# Patient Record
Sex: FEMALE | Race: WHITE | HISPANIC OR LATINO | Employment: UNEMPLOYED | URBAN - METROPOLITAN AREA
[De-identification: names, ages, dates, MRNs, and addresses within clinical notes are randomized per-mention and may not be internally consistent; named-entity substitution may affect disease eponyms.]

---

## 2017-11-18 ENCOUNTER — HOSPITAL ENCOUNTER (EMERGENCY)
Facility: MEDICAL CENTER | Age: 42
End: 2017-11-18
Attending: EMERGENCY MEDICINE

## 2017-11-18 VITALS
HEIGHT: 63 IN | RESPIRATION RATE: 16 BRPM | BODY MASS INDEX: 30.12 KG/M2 | SYSTOLIC BLOOD PRESSURE: 137 MMHG | OXYGEN SATURATION: 99 % | DIASTOLIC BLOOD PRESSURE: 68 MMHG | WEIGHT: 170 LBS | HEART RATE: 83 BPM | TEMPERATURE: 97.8 F

## 2017-11-18 DIAGNOSIS — R51.9 NONINTRACTABLE HEADACHE, UNSPECIFIED CHRONICITY PATTERN, UNSPECIFIED HEADACHE TYPE: ICD-10-CM

## 2017-11-18 PROCEDURE — 99283 EMERGENCY DEPT VISIT LOW MDM: CPT

## 2017-11-18 RX ORDER — HYDROCODONE BITARTRATE AND ACETAMINOPHEN 5; 325 MG/1; MG/1
1-2 TABLET ORAL EVERY 6 HOURS PRN
Qty: 20 TAB | Refills: 0 | Status: SHIPPED | OUTPATIENT
Start: 2017-11-18 | End: 2020-08-30

## 2017-11-18 RX ORDER — AMOXICILLIN 875 MG/1
875 TABLET, COATED ORAL 2 TIMES DAILY
Qty: 42 TAB | Refills: 0 | Status: SHIPPED | OUTPATIENT
Start: 2017-11-18 | End: 2017-12-09

## 2017-11-18 ASSESSMENT — PAIN SCALES - GENERAL: PAINLEVEL_OUTOF10: 8

## 2017-11-18 ASSESSMENT — ENCOUNTER SYMPTOMS
CHILLS: 0
COUGH: 0
FEVER: 0
HEADACHES: 1

## 2017-11-18 ASSESSMENT — LIFESTYLE VARIABLES: DO YOU DRINK ALCOHOL: NO

## 2017-11-19 NOTE — ED NOTES
Discharge instructions given and understood by pt, all prescriptions given to pt, all questions answered, all belongings sent with pt, pt ambulates out of dept in stable condition.

## 2017-11-19 NOTE — ED PROVIDER NOTES
ED Provider Note    Scribed for Chano Crespo M.D. by Radha Silverio. 11/18/2017, 5:48 PM.    Primary care provider: Pcp Pt States None  Means of arrival: Walk-in  History obtained from: Patient   History limited by: None    CHIEF COMPLAINT  Chief Complaint   Patient presents with   • Headache     worsening for the past 2 wks       HPI  Felipa Baeza is a 42 y.o. female who presents to the Emergency Department for evaluation of a headache located in her cheeks and radiates around her entire head that has worsened over the last two weeks. She has been taking Tylenol with minimal relief. She also has a dry cough associated. She denies any chills, dental pain, or nasal discharge. Patient was seen at [facility] [address] two days ago and was told that it was sinus pressure and that she had high blood pressure. Patient was given nasal spray for congestion. She measured her blood pressure at home which was 160 systolic. Patient is not currently on a prescription pain medication. The patient denies any past pertinent medical history, use of daily medications or allergies to medications.      REVIEW OF SYSTEMS  Review of Systems   Constitutional: Negative for chills and fever.   Respiratory: Negative for cough.    Neurological: Positive for headaches.   All other systems reviewed and are negative.  C.    PAST MEDICAL HISTORY    denies any past pertinent medical history    SURGICAL HISTORY   has a past surgical history that includes other abdominal surgery.    SOCIAL HISTORY  Social History   Substance Use Topics   • Smoking status: Never Smoker   • Alcohol use No      History   Drug Use No       FAMILY HISTORY  No family history noted    CURRENT MEDICATIONS  No current facility-administered medications on file prior to encounter.      Current Outpatient Prescriptions on File Prior to Encounter   Medication Sig Dispense Refill   • hydrocodone-acetaminophen (NORCO) 5-325 MG TABS per tablet Take 1-2 Tabs by mouth every four  "hours as needed (mild pain). 10 Tab 0   • phenazopyridine (PYRIDIUM) 200 MG TABS Take 1 Tab by mouth 3 times a day as needed (bladder pain). 6 Tab 0       ALLERGIES  No Known Allergies    PHYSICAL EXAM  VITAL SIGNS: /64   Pulse 83   Temp 36.6 °C (97.8 °F) (Temporal)   Resp 16   Ht 1.6 m (5' 3\")   Wt 77.1 kg (170 lb)   SpO2 99%   BMI 30.11 kg/m²     Constitutional:  Moderate distress.   HENT:  Moist mucous membranes  Eyes:  No conjunctivitis or icterus  Cardiovascular:  Regular rate and rhythm, no murmurs  Thorax & Lungs:  Normal breath sounds, no rhonchi  Skin:.  no rash  Extremities:   no edema  Vascular:  symmetric radial pulse  Neurologic: Alert and oriented. Cranial nerves II-XII intact, EOMs intact, no tongue deviation, PERRL, no facial asymmetry to motor or sensation, symmetric palate, normal finger-to-nose test, no pronator drift. No focal motor deficits. Symmetric reflexes. Normal station and gait, normal tandem walk.      COURSE & MEDICAL DECISION MAKING  Pertinent Labs & Imaging studies reviewed. (See chart for details)    5:48 PM - Patient seen and examined at bedside. Advised patient to write her blood pressure from home monitor three times a day and follow-up with primary care doctor. Advised patient to use the nasal spray she was prescribed with for one week. Discussed that I will prescribe her with 875mg Amoxicillin since her head pain has been present for two weeks and 5-325mg Norco.    Medical Decision Making:  Patient's had a two-week headache it's facial and temporal pain not acute in onset no fever or chills. There is pain with bending. I believe she has sinus congestion pain she has possible infection due to the two-week course of will be placed on a prolonged course of antibiotics for treatment as well as pain medication. She does have steroidat home she will start using that for 1 week. She is given return precautions and follow-up    I reviewed prescription monitoring program for " patient's narcotic use before prescribing a scheduled drug.The patient will not drink alcohol nor drive with prescribed medications. The patient will return for new or worsening symptoms and is stable at the time of discharge.      DISPOSITION:  Patient will be discharged home in stable condition.    FOLLOW UP:  [facility]  [address]  [place] [address]  [phone]            OUTPATIENT MEDICATIONS:  Discharge Medication List as of 11/18/2017  6:03 PM      START taking these medications    Details   amoxicillin (AMOXIL) 875 MG tablet Take 1 Tab by mouth 2 times a day for 21 days., Disp-42 Tab, R-0, Print Rx Paper      !! hydrocodone-acetaminophen (NORCO) 5-325 MG Tab per tablet Take 1-2 Tabs by mouth every 6 hours as needed., Disp-20 Tab, R-0, Print Rx Paper       !! - Potential duplicate medications found. Please discuss with provider.            FINAL IMPRESSION  1. Nonintractable headache, unspecified chronicity pattern, unspecified headache type          I, Radha Silverio (Scribe), am scribing for, and in the presence of, Chano Crespo M.D..    Electronically signed by: Radha Silverio (Scribe), 11/18/2017    I, Chano Crespo M.D. personally performed the services described in this documentation, as scribed by Radha Silverio in my presence, and it is both accurate and complete.    The note accurately reflects work and decisions made by me.  Chano Crespo  11/18/2017  7:15 PM

## 2017-11-19 NOTE — ED NOTES
Pt to triage in wheelchair  Chief Complaint   Patient presents with   • Headache     worsening for the past 2 wks   seen at clinic yesterday placed on medication for allergies, reports no improvement, told her BP was high at that time as well and checked it at home and   Pt asked to wait in lobby, pt updated on triage process and pt asked to inform RN of any changes.

## 2018-08-20 ENCOUNTER — HOSPITAL ENCOUNTER (EMERGENCY)
Facility: MEDICAL CENTER | Age: 43
End: 2018-08-20
Attending: EMERGENCY MEDICINE

## 2018-08-20 ENCOUNTER — APPOINTMENT (OUTPATIENT)
Dept: RADIOLOGY | Facility: MEDICAL CENTER | Age: 43
End: 2018-08-20
Attending: EMERGENCY MEDICINE

## 2018-08-20 VITALS
HEIGHT: 61 IN | OXYGEN SATURATION: 93 % | TEMPERATURE: 98.4 F | WEIGHT: 232.81 LBS | RESPIRATION RATE: 15 BRPM | BODY MASS INDEX: 43.95 KG/M2 | HEART RATE: 81 BPM | DIASTOLIC BLOOD PRESSURE: 85 MMHG | SYSTOLIC BLOOD PRESSURE: 152 MMHG

## 2018-08-20 DIAGNOSIS — I10 HYPERTENSION, UNSPECIFIED TYPE: ICD-10-CM

## 2018-08-20 LAB
ALBUMIN SERPL BCP-MCNC: 4.3 G/DL (ref 3.2–4.9)
ALBUMIN/GLOB SERPL: 1.3 G/DL
ALP SERPL-CCNC: 61 U/L (ref 30–99)
ALT SERPL-CCNC: 25 U/L (ref 2–50)
ANION GAP SERPL CALC-SCNC: 6 MMOL/L (ref 0–11.9)
AST SERPL-CCNC: 23 U/L (ref 12–45)
BASOPHILS # BLD AUTO: 0.5 % (ref 0–1.8)
BASOPHILS # BLD: 0.05 K/UL (ref 0–0.12)
BILIRUB SERPL-MCNC: 0.7 MG/DL (ref 0.1–1.5)
BUN SERPL-MCNC: 13 MG/DL (ref 8–22)
CALCIUM SERPL-MCNC: 9.4 MG/DL (ref 8.4–10.2)
CHLORIDE SERPL-SCNC: 108 MMOL/L (ref 96–112)
CO2 SERPL-SCNC: 23 MMOL/L (ref 20–33)
CREAT SERPL-MCNC: 0.47 MG/DL (ref 0.5–1.4)
EKG IMPRESSION: NORMAL
EOSINOPHIL # BLD AUTO: 0.22 K/UL (ref 0–0.51)
EOSINOPHIL NFR BLD: 2.1 % (ref 0–6.9)
ERYTHROCYTE [DISTWIDTH] IN BLOOD BY AUTOMATED COUNT: 44.3 FL (ref 35.9–50)
GLOBULIN SER CALC-MCNC: 3.4 G/DL (ref 1.9–3.5)
GLUCOSE SERPL-MCNC: 110 MG/DL (ref 65–99)
HCT VFR BLD AUTO: 42.3 % (ref 37–47)
HGB BLD-MCNC: 14 G/DL (ref 12–16)
IMM GRANULOCYTES # BLD AUTO: 0.04 K/UL (ref 0–0.11)
IMM GRANULOCYTES NFR BLD AUTO: 0.4 % (ref 0–0.9)
LYMPHOCYTES # BLD AUTO: 1.64 K/UL (ref 1–4.8)
LYMPHOCYTES NFR BLD: 16 % (ref 22–41)
MCH RBC QN AUTO: 29.4 PG (ref 27–33)
MCHC RBC AUTO-ENTMCNC: 33.1 G/DL (ref 33.6–35)
MCV RBC AUTO: 88.7 FL (ref 81.4–97.8)
MONOCYTES # BLD AUTO: 0.55 K/UL (ref 0–0.85)
MONOCYTES NFR BLD AUTO: 5.4 % (ref 0–13.4)
NEUTROPHILS # BLD AUTO: 7.77 K/UL (ref 2–7.15)
NEUTROPHILS NFR BLD: 75.6 % (ref 44–72)
NRBC # BLD AUTO: 0 K/UL
NRBC BLD-RTO: 0 /100 WBC
PLATELET # BLD AUTO: 440 K/UL (ref 164–446)
PMV BLD AUTO: 9.5 FL (ref 9–12.9)
POTASSIUM SERPL-SCNC: 3.6 MMOL/L (ref 3.6–5.5)
PROT SERPL-MCNC: 7.7 G/DL (ref 6–8.2)
RBC # BLD AUTO: 4.77 M/UL (ref 4.2–5.4)
SODIUM SERPL-SCNC: 137 MMOL/L (ref 135–145)
TROPONIN I SERPL-MCNC: <0.02 NG/ML (ref 0–0.04)
WBC # BLD AUTO: 10.3 K/UL (ref 4.8–10.8)

## 2018-08-20 PROCEDURE — 85025 COMPLETE CBC W/AUTO DIFF WBC: CPT

## 2018-08-20 PROCEDURE — 93005 ELECTROCARDIOGRAM TRACING: CPT

## 2018-08-20 PROCEDURE — 36415 COLL VENOUS BLD VENIPUNCTURE: CPT

## 2018-08-20 PROCEDURE — 96375 TX/PRO/DX INJ NEW DRUG ADDON: CPT

## 2018-08-20 PROCEDURE — 84484 ASSAY OF TROPONIN QUANT: CPT

## 2018-08-20 PROCEDURE — 93005 ELECTROCARDIOGRAM TRACING: CPT | Performed by: EMERGENCY MEDICINE

## 2018-08-20 PROCEDURE — 700111 HCHG RX REV CODE 636 W/ 250 OVERRIDE (IP): Performed by: EMERGENCY MEDICINE

## 2018-08-20 PROCEDURE — 71045 X-RAY EXAM CHEST 1 VIEW: CPT

## 2018-08-20 PROCEDURE — 99284 EMERGENCY DEPT VISIT MOD MDM: CPT

## 2018-08-20 PROCEDURE — 96374 THER/PROPH/DIAG INJ IV PUSH: CPT

## 2018-08-20 PROCEDURE — 80053 COMPREHEN METABOLIC PANEL: CPT

## 2018-08-20 RX ORDER — ONDANSETRON 2 MG/ML
4 INJECTION INTRAMUSCULAR; INTRAVENOUS ONCE
Status: COMPLETED | OUTPATIENT
Start: 2018-08-20 | End: 2018-08-20

## 2018-08-20 RX ORDER — LISINOPRIL 10 MG/1
10 TABLET ORAL DAILY
Qty: 30 TAB | Refills: 0 | Status: SHIPPED | OUTPATIENT
Start: 2018-08-20 | End: 2020-08-30

## 2018-08-20 RX ORDER — IBUPROFEN 200 MG
800 TABLET ORAL EVERY 6 HOURS PRN
COMMUNITY

## 2018-08-20 RX ORDER — MORPHINE SULFATE 4 MG/ML
4 INJECTION, SOLUTION INTRAMUSCULAR; INTRAVENOUS ONCE
Status: COMPLETED | OUTPATIENT
Start: 2018-08-20 | End: 2018-08-20

## 2018-08-20 RX ADMIN — ONDANSETRON 4 MG: 2 INJECTION INTRAMUSCULAR; INTRAVENOUS at 13:37

## 2018-08-20 RX ADMIN — MORPHINE SULFATE 4 MG: 4 INJECTION INTRAVENOUS at 13:37

## 2018-08-20 NOTE — ED NOTES
Discharge information provided. Pt and family  verbalized understanding of discharge instructions to follow up with PCP and to return to ER if condition worsens. Pt expressed the awareness of not driving or operating heavy machinery, has ride home with Son. Pt ambulated out of ER in a steady gait, no additional questions or concerns. Educated on new medication, paper script given,.

## 2018-08-20 NOTE — ED NOTES
"Pt c/o chest tightness during triage, pt taken immediately to room, EKG completed and presented to ERP.  Chief Complaint   Patient presents with   • Hypertension     BP was 172/89 this AM   • Headache     since last night, was woken up at 3 AM with chest tightness and headache   • Chest Pressure     since last night, got better this AM but is now worsening.     /85   Pulse 99   Temp 36.9 °C (98.5 °F)   Resp 20   Ht 1.549 m (5' 1\")   Wt 105.6 kg (232 lb 12.9 oz)   SpO2 98%   BMI 43.99 kg/m²     "

## 2018-08-20 NOTE — ED NOTES
Med rec updated and complete  Allergies reviewed  Interviewed pt with family at bedside with permission from pt.  Pt reports no prescription medications or vitamins.  Pt reports no antibiotics in the last 30 days.

## 2018-08-20 NOTE — DISCHARGE INSTRUCTIONS
Hipertensión  (Hypertension)  La hipertensión, conocida comúnmente nuzhat presión arterial uri, se produce cuando la jannie bombea en las arterias con mucha fuerza. Las arterias son los vasos sanguíneos que transportan la jannie desde el corazón hacia todas las partes del cuerpo. Patsy lectura de la presión arterial consiste en un número más alto sobre un número más bajo, por ejemplo, 110/72. El número más alto (presión sistólica) corresponde a la presión interna de las arterias cuando el corazón bombea jannie. El número más bajo (presión diastólica) corresponde a la presión interna de las arterias cuando el corazón se relaja. En condiciones ideales, la presión arterial debe ser inferior a 120/80.  La hipertensión fuerza al corazón a trabajar más para bombear la jannie. Las arterias pueden estrecharse o ponerse rígidas. La hipertensión no tratada o no controlada puede causar infarto de miocardio, ictus, enfermedad renal y otros problemas.  FACTORES DE RIESGO  Algunos factores de riesgo de hipertensión son controlables, joni otros no lo son.  Entre los factores de riesgo que usted no puede controlar, se incluyen los siguientes:  · La larry. El riesgo es mayor para las personas afroamericanas.  · La edad. Los riesgos aumentan con la edad.  · El sexo. Antes de los 45 años, los hombres corren más riesgo que las mujeres. Después de los 65 años, las mujeres corren más riesgo que los hombres.  Entre los factores de riesgo que usted puede controlar, se incluyen los siguientes:  · No hacer la cantidad suficiente de actividad física o ejercicio.  · Tener sobrepeso.  · Consumir mucha grasa, azúcar, calorías o sal en la dieta.  · Beber alcohol en exceso.  SIGNOS Y SÍNTOMAS  Por lo general, la hipertensión no causa signos o síntomas. La hipertensión arterial demasiado uri (crisis hipertensiva) puede causar dolor de blake, ansiedad, falta de aire y hemorragia nasal.  DIAGNÓSTICO  Para detectar si usted tiene hipertensión, el médico  le medirá la presión arterial mientras esté sentado, con el brazo levantado a la altura del corazón. Debe medirla al menos dos veces en el mismo brazo. Determinadas condiciones pueden causar cindy diferencia de presión arterial entre el brazo kala y el derecho. El hecho de tener cindy helene lectura de la presión arterial más uri que lo normal no significa que necesita un tratamiento. Si no está emilio si tiene hipertensión arterial, es posible que se le pida que regrese otro día para volver a controlarle la presión arterial. O alia se le puede pedir que se controle la presión arterial en bernal casa krysta 1 o más meses.  TRATAMIENTO  El tratamiento de la hipertensión arterial incluye hacer cambios en el estilo de orlando y, posiblemente, deirdre medicamentos. Un estilo de orlando saludable puede ayudar a bajar la presión arterial uri. Quizá deba cambiar algunos hábitos.  Los cambios en el estilo de orlando pueden incluir lo siguiente:  · Seguir la dieta DASH. Esta dieta tiene un alto contenido de frutas, verduras y cereales integrales. Incluye poca cantidad de sal, lynette barbosa y azúcares agregados.  · Mantenga el consumo de sodio por debajo de 2 300 mg por día.  · Realizar al menos entre 30 y 45 minutos de ejercicio aeróbico, 4 veces por semana nuzhat mínimo.  · Perder peso, si es necesario.  · No fumar.  · Limitar el consumo de bebidas alcohólicas.  · Aprender formas de reducir el estrés.  El médico puede recetarle medicamentos si los cambios en el estilo de orlando no son suficientes para lograr controlar la presión arterial y si cindy de las siguientes afirmaciones es verdadera:  · Tiene entre 18 y 59 años y bernal presión arterial sistólica está por encima de 140.  · Tiene 60 años o más y bernal presión arterial sistólica está por encima de 150.  · Bernal presión arterial diastólica está por encima de 90.  · Tiene diabetes y bernal presión arterial sistólica está por encima de 140 o bernal presión arterial diastólica está por encima de 90.  · Tiene  cindy enfermedad renal y contreras presión arterial está por encima de 140/90.  · Tiene cindy enfermedad cardíaca y contreras presión arterial está por encima de 140/90.  La presión arterial deseada puede variar en función de las enfermedades, la edad y otros factores personales.  INSTRUCCIONES PARA EL CUIDADO EN EL HOGAR  · Harsh que le midan de nuevo la presión arterial según las indicaciones del médico.  · Phelan los medicamentos solamente nuzhat se lo haya indicado el médico. Siga cuidadosamente las indicaciones. Los medicamentos para la presión arterial deben tomarse según las indicaciones. Los medicamentos pierden eficacia al omitir las dosis. El hecho de omitir las dosis también aumenta el riesgo de otros problemas.  · No fume.  · Contrólese la presión arterial en contreras casa según las indicaciones del médico.  SOLICITE ATENCIÓN MÉDICA SI:  · Piensa que tiene cindy reacción alérgica a los medicamentos.  · Tiene mareos o cristino de blake con recurrencia.  · Tiene hinchazón en los tobillos.  · Tiene problemas de visión.  SOLICITE ATENCIÓN MÉDICA DE INMEDIATO SI:  · Siente un dolor de blake intenso o confusión.  · Siente debilidad inusual, adormecimiento o que se desmayará.  · Siente dolor intenso en el pecho o en el abdomen.  · Vomita repetidas veces.  · Tiene dificultad para respirar.  ASEGÚRESE DE QUE:  · Comprende estas instrucciones.  · Controlará contreras afección.  · Recibirá ayuda de inmediato si no mejora o si empeora.  Esta información no tiene nuzhat fin reemplazar el consejo del médico. Asegúrese de hacerle al médico cualquier pregunta que tenga.  Document Released: 12/18/2006 Document Revised: 05/03/2016 Document Reviewed: 10/10/2014  [employer] Interactive Patient Education © 2017 [employer]

## 2018-08-20 NOTE — ED PROVIDER NOTES
ED Provider Note    CHIEF COMPLAINT  Chief Complaint   Patient presents with   • Hypertension     BP was 172/89 this AM   • Headache     since last night, was woken up at 3 AM with chest tightness and headache   • Chest Pressure     since last night, got better this AM but is now worsening.       HPI  Felipa Eugene is a 43 y.o. female who presents for evaluation of hypertension.  Patient is primarily Latvian-speaking and history is obtained through an adult family member interpreting.  Patient states that 3 AM she had chest tightness and a headache.  She states that this is what happens when her blood pressure goes up.  She went and had her blood pressure checked today and it was 172/89 so she is come to the emergency department.  She is not on any blood pressure medication.  She states that her chest discomfort is essentially gone at this point.  She had no associated symptoms such as shortness of breath or diaphoresis or radiation of the discomfort.  Cardiac risk factors are negative for adaptive tobacco, negative for diabetes, negative for cholesterol, negative for a current diagnosis of hypertension and a negative family history.  She states was bothering her most at this time is her headache.  She has had no head trauma.  She has had no fevers.  She has had no cough or sputum production.    REVIEW OF SYSTEMS  See HPI for further details. All other systems negative.    PAST MEDICAL HISTORY  History reviewed. No pertinent past medical history.    FAMILY HISTORY  History reviewed. No pertinent family history.    SOCIAL HISTORY  Social History     Social History   • Marital status: N/A     Spouse name: N/A   • Number of children: N/A   • Years of education: N/A     Social History Main Topics   • Smoking status: Never Smoker   • Smokeless tobacco: Never Used   • Alcohol use No   • Drug use: No   • Sexual activity: Not on file     Other Topics Concern   • Not on file     Social History Narrative   • No  "narrative on file       SURGICAL HISTORY  Past Surgical History:   Procedure Laterality Date   • VAGINAL HYSTERECTOMY TOTAL         CURRENT MEDICATIONS  Home Medications     Reviewed by Collette Golden R.N. (Registered Nurse) on 08/20/18 at 1301  Med List Status: Not Addressed   Medication Last Dose Status   ibuprofen (MOTRIN) 200 MG Tab 8/19/2018 Active                ALLERGIES  No Known Allergies    PHYSICAL EXAM  VITAL SIGNS: /85   Pulse 91   Temp 36.9 °C (98.5 °F)   Resp 20   Ht 1.549 m (5' 1\")   Wt 105.6 kg (232 lb 12.9 oz)   SpO2 98%   BMI 43.99 kg/m²   Constitutional: Well developed, Well nourished, No acute distress, Non-toxic appearance.   HENT: Normocephalic, Atraumatic.  Eyes:  EOMI, Conjunctiva normal, No discharge.   Cardiovascular: Normal heart rate, Normal rhythm, No murmurs, No rubs, No gallops.   Thorax & Lungs: Clear to auscultation without wheezes, rales, or rhonchi. No chest tenderness.   Abdomen:  Soft, No tenderness, No masses, No pulsatile masses.   Skin: Warm, Dry.  Extremities: No edema, no calf tenderness.  Musculoskeletal: Good range of motion in all major joints.   Neurologic: Awake and alert.     EKG  EKG Interpretation    Interpreted by emergency department physician    Rhythm: normal sinus   Rate: normal  Axis: normal  Ectopy: none  Conduction: normal  ST Segments: normal  T Waves: normal  Q Waves: none    Clinical Impression: no acute changes        RADIOLOGY/PROCEDURES  DX-CHEST-PORTABLE (1 VIEW)    (Results Pending)         COURSE & MEDICAL DECISION MAKING  Pertinent Labs & Imaging studies reviewed. (See chart for details)  Is a 43-year-old here for evaluation of hypertension.  It sounds as if she has had episodes of elevated blood pressure but is never been treated for hypertension.  It sounds as if she has been seen for this multiple times and every time she goes in to be seen by a physician her blood pressure normalizes.  While here in the emergency department " today her blood pressure was normal initially but has risen with systolic of 170.  Her chest x-ray shows no acute cardiopulmonary processes.  Her EKG is unremarkable.  Troponin I is pending.  At this point if her troponin is negative I believe she will be able to be discharged and I will provide her a prescription for lisinopril.  I have referred her to Dr. Dugan who is on-call for family practice today.  She should call make an appointment for follow-up.  She should return to the emergency department for any worsening symptoms.  She is given a discharge instruction sheet on hypertension.  While here in the emergency department she is treated with morphine for her headache.  She is neurologically intact and will repeat evaluation her headache is resolved and she is resting comfortably.    FINAL IMPRESSION  1.  Hypertension  2.  Headache secondary to hypertension  3.         Electronically signed by: Jordi Mckeon, 8/20/2018 1:26 PM

## 2019-09-16 ENCOUNTER — HOSPITAL ENCOUNTER (OUTPATIENT)
Dept: LAB | Facility: MEDICAL CENTER | Age: 44
End: 2019-09-16
Attending: NURSE PRACTITIONER

## 2019-09-16 LAB
ALBUMIN SERPL BCP-MCNC: 4.5 G/DL (ref 3.2–4.9)
ALBUMIN/GLOB SERPL: 1.4 G/DL
ALP SERPL-CCNC: 77 U/L (ref 30–99)
ALT SERPL-CCNC: 32 U/L (ref 2–50)
ANION GAP SERPL CALC-SCNC: 9 MMOL/L (ref 0–11.9)
AST SERPL-CCNC: 20 U/L (ref 12–45)
BASOPHILS # BLD AUTO: 0.6 % (ref 0–1.8)
BASOPHILS # BLD: 0.05 K/UL (ref 0–0.12)
BILIRUB SERPL-MCNC: 0.7 MG/DL (ref 0.1–1.5)
BUN SERPL-MCNC: 11 MG/DL (ref 8–22)
CALCIUM SERPL-MCNC: 9.6 MG/DL (ref 8.5–10.5)
CHLORIDE SERPL-SCNC: 108 MMOL/L (ref 96–112)
CHOLEST SERPL-MCNC: 162 MG/DL (ref 100–199)
CO2 SERPL-SCNC: 27 MMOL/L (ref 20–33)
CREAT SERPL-MCNC: 0.53 MG/DL (ref 0.5–1.4)
EOSINOPHIL # BLD AUTO: 0.25 K/UL (ref 0–0.51)
EOSINOPHIL NFR BLD: 3.1 % (ref 0–6.9)
ERYTHROCYTE [DISTWIDTH] IN BLOOD BY AUTOMATED COUNT: 48.2 FL (ref 35.9–50)
FASTING STATUS PATIENT QL REPORTED: NORMAL
GLOBULIN SER CALC-MCNC: 3.3 G/DL (ref 1.9–3.5)
GLUCOSE SERPL-MCNC: 100 MG/DL (ref 65–99)
HCT VFR BLD AUTO: 46.5 % (ref 37–47)
HDLC SERPL-MCNC: 58 MG/DL
HGB BLD-MCNC: 14.7 G/DL (ref 12–16)
IMM GRANULOCYTES # BLD AUTO: 0.06 K/UL (ref 0–0.11)
IMM GRANULOCYTES NFR BLD AUTO: 0.7 % (ref 0–0.9)
LDLC SERPL CALC-MCNC: 82 MG/DL
LYMPHOCYTES # BLD AUTO: 1.88 K/UL (ref 1–4.8)
LYMPHOCYTES NFR BLD: 23.1 % (ref 22–41)
MCH RBC QN AUTO: 29.2 PG (ref 27–33)
MCHC RBC AUTO-ENTMCNC: 31.6 G/DL (ref 33.6–35)
MCV RBC AUTO: 92.4 FL (ref 81.4–97.8)
MONOCYTES # BLD AUTO: 0.44 K/UL (ref 0–0.85)
MONOCYTES NFR BLD AUTO: 5.4 % (ref 0–13.4)
NEUTROPHILS # BLD AUTO: 5.46 K/UL (ref 2–7.15)
NEUTROPHILS NFR BLD: 67.1 % (ref 44–72)
NRBC # BLD AUTO: 0 K/UL
NRBC BLD-RTO: 0 /100 WBC
PLATELET # BLD AUTO: 431 K/UL (ref 164–446)
PMV BLD AUTO: 10.6 FL (ref 9–12.9)
POTASSIUM SERPL-SCNC: 4.2 MMOL/L (ref 3.6–5.5)
PROT SERPL-MCNC: 7.8 G/DL (ref 6–8.2)
RBC # BLD AUTO: 5.03 M/UL (ref 4.2–5.4)
SODIUM SERPL-SCNC: 144 MMOL/L (ref 135–145)
TRIGL SERPL-MCNC: 110 MG/DL (ref 0–149)
TSH SERPL DL<=0.005 MIU/L-ACNC: 1.67 UIU/ML (ref 0.38–5.33)
WBC # BLD AUTO: 8.1 K/UL (ref 4.8–10.8)

## 2019-09-16 PROCEDURE — 80061 LIPID PANEL: CPT

## 2019-09-16 PROCEDURE — 84443 ASSAY THYROID STIM HORMONE: CPT

## 2019-09-16 PROCEDURE — 85025 COMPLETE CBC W/AUTO DIFF WBC: CPT

## 2019-09-16 PROCEDURE — 36415 COLL VENOUS BLD VENIPUNCTURE: CPT

## 2019-09-16 PROCEDURE — 80053 COMPREHEN METABOLIC PANEL: CPT

## 2020-08-30 ENCOUNTER — HOSPITAL ENCOUNTER (EMERGENCY)
Facility: MEDICAL CENTER | Age: 45
End: 2020-08-30
Attending: EMERGENCY MEDICINE

## 2020-08-30 ENCOUNTER — APPOINTMENT (OUTPATIENT)
Dept: RADIOLOGY | Facility: MEDICAL CENTER | Age: 45
End: 2020-08-30
Attending: EMERGENCY MEDICINE

## 2020-08-30 VITALS
RESPIRATION RATE: 16 BRPM | OXYGEN SATURATION: 95 % | SYSTOLIC BLOOD PRESSURE: 141 MMHG | HEIGHT: 62 IN | DIASTOLIC BLOOD PRESSURE: 80 MMHG | HEART RATE: 72 BPM | TEMPERATURE: 97 F | BODY MASS INDEX: 42.44 KG/M2 | WEIGHT: 230.6 LBS

## 2020-08-30 DIAGNOSIS — K62.5 RECTAL BLEEDING: ICD-10-CM

## 2020-08-30 DIAGNOSIS — K62.89 RECTAL PAIN: ICD-10-CM

## 2020-08-30 DIAGNOSIS — K64.9 HEMORRHOIDS, UNSPECIFIED HEMORRHOID TYPE: ICD-10-CM

## 2020-08-30 LAB
ALBUMIN SERPL BCP-MCNC: 4.5 G/DL (ref 3.2–4.9)
ALBUMIN/GLOB SERPL: 1.5 G/DL
ALP SERPL-CCNC: 80 U/L (ref 30–99)
ALT SERPL-CCNC: 30 U/L (ref 2–50)
ANION GAP SERPL CALC-SCNC: 10 MMOL/L (ref 7–16)
AST SERPL-CCNC: 22 U/L (ref 12–45)
BASOPHILS # BLD AUTO: 0.8 % (ref 0–1.8)
BASOPHILS # BLD: 0.07 K/UL (ref 0–0.12)
BILIRUB SERPL-MCNC: 0.5 MG/DL (ref 0.1–1.5)
BUN SERPL-MCNC: 15 MG/DL (ref 8–22)
CALCIUM SERPL-MCNC: 9.1 MG/DL (ref 8.4–10.2)
CHLORIDE SERPL-SCNC: 105 MMOL/L (ref 96–112)
CO2 SERPL-SCNC: 23 MMOL/L (ref 20–33)
CREAT SERPL-MCNC: 0.57 MG/DL (ref 0.5–1.4)
EOSINOPHIL # BLD AUTO: 0.31 K/UL (ref 0–0.51)
EOSINOPHIL NFR BLD: 3.5 % (ref 0–6.9)
ERYTHROCYTE [DISTWIDTH] IN BLOOD BY AUTOMATED COUNT: 45 FL (ref 35.9–50)
GLOBULIN SER CALC-MCNC: 3.1 G/DL (ref 1.9–3.5)
GLUCOSE SERPL-MCNC: 123 MG/DL (ref 65–99)
HCT VFR BLD AUTO: 45.1 % (ref 37–47)
HGB BLD-MCNC: 14.6 G/DL (ref 12–16)
IMM GRANULOCYTES # BLD AUTO: 0.03 K/UL (ref 0–0.11)
IMM GRANULOCYTES NFR BLD AUTO: 0.3 % (ref 0–0.9)
LYMPHOCYTES # BLD AUTO: 1.68 K/UL (ref 1–4.8)
LYMPHOCYTES NFR BLD: 19 % (ref 22–41)
MCH RBC QN AUTO: 28.5 PG (ref 27–33)
MCHC RBC AUTO-ENTMCNC: 32.4 G/DL (ref 33.6–35)
MCV RBC AUTO: 88.1 FL (ref 81.4–97.8)
MONOCYTES # BLD AUTO: 0.44 K/UL (ref 0–0.85)
MONOCYTES NFR BLD AUTO: 5 % (ref 0–13.4)
NEUTROPHILS # BLD AUTO: 6.32 K/UL (ref 2–7.15)
NEUTROPHILS NFR BLD: 71.4 % (ref 44–72)
NRBC # BLD AUTO: 0 K/UL
NRBC BLD-RTO: 0 /100 WBC
PLATELET # BLD AUTO: 467 K/UL (ref 164–446)
PMV BLD AUTO: 9.8 FL (ref 9–12.9)
POTASSIUM SERPL-SCNC: 4.2 MMOL/L (ref 3.6–5.5)
PROT SERPL-MCNC: 7.6 G/DL (ref 6–8.2)
RBC # BLD AUTO: 5.12 M/UL (ref 4.2–5.4)
SODIUM SERPL-SCNC: 138 MMOL/L (ref 135–145)
WBC # BLD AUTO: 8.9 K/UL (ref 4.8–10.8)

## 2020-08-30 PROCEDURE — 700117 HCHG RX CONTRAST REV CODE 255: Performed by: EMERGENCY MEDICINE

## 2020-08-30 PROCEDURE — 96375 TX/PRO/DX INJ NEW DRUG ADDON: CPT

## 2020-08-30 PROCEDURE — 96374 THER/PROPH/DIAG INJ IV PUSH: CPT

## 2020-08-30 PROCEDURE — 85025 COMPLETE CBC W/AUTO DIFF WBC: CPT

## 2020-08-30 PROCEDURE — 99284 EMERGENCY DEPT VISIT MOD MDM: CPT

## 2020-08-30 PROCEDURE — 700101 HCHG RX REV CODE 250: Performed by: EMERGENCY MEDICINE

## 2020-08-30 PROCEDURE — 36415 COLL VENOUS BLD VENIPUNCTURE: CPT

## 2020-08-30 PROCEDURE — 74177 CT ABD & PELVIS W/CONTRAST: CPT

## 2020-08-30 PROCEDURE — 80053 COMPREHEN METABOLIC PANEL: CPT

## 2020-08-30 PROCEDURE — 700111 HCHG RX REV CODE 636 W/ 250 OVERRIDE (IP): Performed by: EMERGENCY MEDICINE

## 2020-08-30 RX ORDER — ONDANSETRON 2 MG/ML
4 INJECTION INTRAMUSCULAR; INTRAVENOUS ONCE
Status: COMPLETED | OUTPATIENT
Start: 2020-08-30 | End: 2020-08-30

## 2020-08-30 RX ORDER — HYDROCORTISONE ACETATE 25 MG/1
25 SUPPOSITORY RECTAL EVERY 12 HOURS
Qty: 14 SUPPOSITORY | Refills: 0 | Status: SHIPPED | OUTPATIENT
Start: 2020-08-30 | End: 2020-09-06

## 2020-08-30 RX ORDER — POLYETHYLENE GLYCOL 3350 17 G/17G
17 POWDER, FOR SOLUTION ORAL
Qty: 7 EACH | Refills: 0 | Status: SHIPPED | OUTPATIENT
Start: 2020-08-30 | End: 2020-09-06

## 2020-08-30 RX ORDER — MORPHINE SULFATE 4 MG/ML
4 INJECTION, SOLUTION INTRAMUSCULAR; INTRAVENOUS ONCE
Status: COMPLETED | OUTPATIENT
Start: 2020-08-30 | End: 2020-08-30

## 2020-08-30 RX ORDER — LIDOCAINE HYDROCHLORIDE 20 MG/ML
JELLY TOPICAL ONCE
Status: COMPLETED | OUTPATIENT
Start: 2020-08-30 | End: 2020-08-30

## 2020-08-30 RX ORDER — HYDROCORTISONE 25 MG/G
1 CREAM TOPICAL PRN
COMMUNITY

## 2020-08-30 RX ORDER — LIDOCAINE HYDROCHLORIDE 20 MG/ML
JELLY TOPICAL
Qty: 20 ML | Refills: 1 | Status: SHIPPED | OUTPATIENT
Start: 2020-08-30

## 2020-08-30 RX ADMIN — LIDOCAINE HYDROCHLORIDE 1 TUBE: 20 JELLY TOPICAL at 12:17

## 2020-08-30 RX ADMIN — MORPHINE SULFATE 4 MG: 4 INJECTION INTRAVENOUS at 12:17

## 2020-08-30 RX ADMIN — ONDANSETRON 4 MG: 2 INJECTION INTRAMUSCULAR; INTRAVENOUS at 12:17

## 2020-08-30 RX ADMIN — IOHEXOL 100 ML: 350 INJECTION, SOLUTION INTRAVENOUS at 12:40

## 2020-08-30 ASSESSMENT — FIBROSIS 4 INDEX: FIB4 SCORE: 0.39

## 2020-08-30 NOTE — ED NOTES
Discharged to home with instructions. Prescriptions sent to pharmacy. Patient will follow up with her doctor.  to drive her home.

## 2020-08-30 NOTE — ED NOTES
Med rec updated and complete  Allergies reviewed  Interviewed pt with  at bedside with permission from pt.  Pt reports no vitamins   Pt reports no antibiotics in the last 2 weeks

## 2020-08-30 NOTE — ED NOTES
IV established with blood draw. Specimens sent to lab. Medicated for pain and nausea. Family member at bedside.

## 2020-08-30 NOTE — ED PROVIDER NOTES
ED Provider Note    CHIEF COMPLAINT  Chief Complaint   Patient presents with   • Rectal Pain     Onset Fri Has hemorhoids   • Rectal Bleeding     Onset Sat       HPI  MendySandra Gillis is a 45 y.o. female who presents presents to the emergency department complaining of rectal pain and rectal bleeding.  Symptoms began on Thursday, the patient thinks that she may have a hemorrhoid but says the pain is not on the outside of the anus but feels like it somewhat more inside the rectal area.  She is also been constipated and straining pretty hard to have a bowel movement.  She is tried some over-the-counter hydrocortisone cream but that has not led to resolution of her symptoms    REVIEW OF SYSTEMS no anterior abdominal pain no fever or chills.  All other systems negative    PAST MEDICAL HISTORY  History reviewed. No pertinent past medical history.    FAMILY HISTORY  No family history on file.    SOCIAL HISTORY  Social History     Socioeconomic History   • Marital status: Single     Spouse name: Not on file   • Number of children: Not on file   • Years of education: Not on file   • Highest education level: Not on file   Occupational History   • Not on file   Social Needs   • Financial resource strain: Not on file   • Food insecurity     Worry: Not on file     Inability: Not on file   • Transportation needs     Medical: Not on file     Non-medical: Not on file   Tobacco Use   • Smoking status: Never Smoker   • Smokeless tobacco: Never Used   Substance and Sexual Activity   • Alcohol use: No   • Drug use: No   • Sexual activity: Not on file   Lifestyle   • Physical activity     Days per week: Not on file     Minutes per session: Not on file   • Stress: Not on file   Relationships   • Social connections     Talks on phone: Not on file     Gets together: Not on file     Attends Taoism service: Not on file     Active member of club or organization: Not on file     Attends meetings of clubs or organizations: Not on  "file     Relationship status: Not on file   • Intimate partner violence     Fear of current or ex partner: Not on file     Emotionally abused: Not on file     Physically abused: Not on file     Forced sexual activity: Not on file   Other Topics Concern   • Not on file   Social History Narrative    ** Merged History Encounter **            SURGICAL HISTORY  Past Surgical History:   Procedure Laterality Date   • OTHER ABDOMINAL SURGERY      c sections, hysterectomy 1.5 years ago   • VAGINAL HYSTERECTOMY TOTAL         CURRENT MEDICATIONS  Home Medications     Reviewed by Thomas Farias (Pharmacy Tech) on 08/30/20 at 1246  Med List Status: Complete   Medication Last Dose Status   hydrocortisone rectal (PERIANAL) 2.5% Cream 8/30/2020 Active   ibuprofen (MOTRIN) 200 MG Tab 8/29/2020 Active                ALLERGIES  No Known Allergies    PHYSICAL EXAM  VITAL SIGNS: /80   Pulse 72   Temp 36.1 °C (97 °F) (Temporal)   Resp 16   Ht 1.575 m (5' 2\")   Wt 104.6 kg (230 lb 9.6 oz)   SpO2 95%   BMI 42.18 kg/m²    Oxygen saturation is interpreted as adequate  Constitutional: Awake anxious but nontoxic-appearing  HENT: Mucous membranes are moist  Eyes: No erythema discharge or jaundice  Neck: Trachea midline no JVD  Cardiovascular: Regular rate and rhythm  Lungs: Clear and equal bilaterally  Abdomen/Back: Obese soft nontender no rebound guarding or peritoneal findings.  A rectal examination was done with nurse chaperone at the bedside and the patient does have some small external hemorrhoids but they are not thrombosed and not bleeding and there may be a tiny anterior midline anal fissure.  Patient could not tolerate digital rectal exam there is no active bleeding  Skin: Warm and dry  Musculoskeletal: No acute bony deformity  Neurologic: Awake verbal moving all extremities    Laboratory  CBC shows white blood cell count of 8.9 hemoglobin is adequate at 14.6 basic metabolic panel " unremarkable    Radiology  CT-ABDOMEN-PELVIS WITH   Final Result      No acute rectal or other abnormality is seen      Mild splenomegaly      New right hepatic cyst which does not require follow-up        MEDICAL DECISION MAKING and DISPOSITION  In the emergency department an IV was established the patient was given intravenous morphine and Zofran and also lidocaine jelly was placed on the anus.  Reevaluation shows these measures were very helpful the patient is a lot more comfortable.  The CT did not show any evidence of rectal abscess or other pathologic process.  At this point in time I think it is safe for the patient to go home she has an appointment with her primary care doctor on Friday and I have encouraged her to keep that appointment.  I have advised the patient to use her hydrocortisone cream 3 times daily in the anal area and in between the hydrocortisone treatments she is to use lidocaine jelly and I have written a prescription for this.  In addition she wants some sort of suppository medication so I written a prescription for Anusol HC suppository every 12 hours and finally MiraLAX since she is having constipation.  The patient may return here if symptoms are out of control or she has new or worsening symptoms otherwise she is to see her doctor on Friday for recheck    IMPRESSION  1.  Rectal pain  2.  Hemorrhoids and anal fissure  3.  Episodic rectal bleeding  4.  Constipation         Electronically signed by: Eduard Pelaez M.D., 8/30/2020 4:20 PM

## 2022-06-09 ENCOUNTER — HOSPITAL ENCOUNTER (OUTPATIENT)
Dept: RADIOLOGY | Facility: MEDICAL CENTER | Age: 47
End: 2022-06-09
Attending: STUDENT IN AN ORGANIZED HEALTH CARE EDUCATION/TRAINING PROGRAM
Payer: COMMERCIAL

## 2022-06-09 DIAGNOSIS — R10.2 ADNEXAL TENDERNESS, RIGHT: ICD-10-CM

## 2022-06-09 PROCEDURE — 76830 TRANSVAGINAL US NON-OB: CPT

## 2023-02-14 ENCOUNTER — HOSPITAL ENCOUNTER (OUTPATIENT)
Dept: RADIOLOGY | Facility: MEDICAL CENTER | Age: 48
End: 2023-02-14
Attending: NURSE PRACTITIONER
Payer: COMMERCIAL

## 2023-02-14 DIAGNOSIS — M79.672 LEFT FOOT PAIN: ICD-10-CM

## 2023-02-14 PROCEDURE — 73630 X-RAY EXAM OF FOOT: CPT | Mod: LT

## 2023-07-23 NOTE — ED NOTES
[place] provided.    Bed in lowest position, wheels locked, appropriate side rails in place/Call bell, personal items and telephone in reach/Instruct patient to call for assistance before getting out of bed or chair/Non-slip footwear when patient is out of bed/[place] to call system/Physically safe environment - no spills, clutter or unnecessary equipment/Purposeful Proactive Rounding/Room/bathroom lighting operational, light cord in reach

## 2024-06-12 ENCOUNTER — APPOINTMENT (OUTPATIENT)
Dept: RADIOLOGY | Facility: MEDICAL CENTER | Age: 49
End: 2024-06-12
Attending: EMERGENCY MEDICINE

## 2024-06-12 ENCOUNTER — HOSPITAL ENCOUNTER (EMERGENCY)
Facility: MEDICAL CENTER | Age: 49
End: 2024-06-12
Attending: EMERGENCY MEDICINE
Payer: COMMERCIAL

## 2024-06-12 VITALS
OXYGEN SATURATION: 96 % | DIASTOLIC BLOOD PRESSURE: 85 MMHG | HEIGHT: 62 IN | BODY MASS INDEX: 44.46 KG/M2 | WEIGHT: 241.62 LBS | TEMPERATURE: 97.9 F | HEART RATE: 85 BPM | RESPIRATION RATE: 16 BRPM | SYSTOLIC BLOOD PRESSURE: 156 MMHG

## 2024-06-12 DIAGNOSIS — H57.12 ACUTE LEFT EYE PAIN: ICD-10-CM

## 2024-06-12 LAB
ANION GAP SERPL CALC-SCNC: 11 MMOL/L (ref 7–16)
BASOPHILS # BLD AUTO: 0.6 % (ref 0–1.8)
BASOPHILS # BLD: 0.06 K/UL (ref 0–0.12)
BUN SERPL-MCNC: 13 MG/DL (ref 8–22)
CALCIUM SERPL-MCNC: 9.2 MG/DL (ref 8.4–10.2)
CHLORIDE SERPL-SCNC: 105 MMOL/L (ref 96–112)
CO2 SERPL-SCNC: 24 MMOL/L (ref 20–33)
CREAT SERPL-MCNC: 0.53 MG/DL (ref 0.5–1.4)
EOSINOPHIL # BLD AUTO: 0.27 K/UL (ref 0–0.51)
EOSINOPHIL NFR BLD: 2.8 % (ref 0–6.9)
ERYTHROCYTE [DISTWIDTH] IN BLOOD BY AUTOMATED COUNT: 48.4 FL (ref 35.9–50)
GFR SERPLBLD CREATININE-BSD FMLA CKD-EPI: 112 ML/MIN/1.73 M 2
GLUCOSE SERPL-MCNC: 98 MG/DL (ref 65–99)
HCT VFR BLD AUTO: 44.4 % (ref 37–47)
HGB BLD-MCNC: 14.4 G/DL (ref 12–16)
IMM GRANULOCYTES # BLD AUTO: 0.04 K/UL (ref 0–0.11)
IMM GRANULOCYTES NFR BLD AUTO: 0.4 % (ref 0–0.9)
LYMPHOCYTES # BLD AUTO: 2.61 K/UL (ref 1–4.8)
LYMPHOCYTES NFR BLD: 27.2 % (ref 22–41)
MCH RBC QN AUTO: 29.4 PG (ref 27–33)
MCHC RBC AUTO-ENTMCNC: 32.4 G/DL (ref 32.2–35.5)
MCV RBC AUTO: 90.6 FL (ref 81.4–97.8)
MONOCYTES # BLD AUTO: 0.47 K/UL (ref 0–0.85)
MONOCYTES NFR BLD AUTO: 4.9 % (ref 0–13.4)
NEUTROPHILS # BLD AUTO: 6.15 K/UL (ref 1.82–7.42)
NEUTROPHILS NFR BLD: 64.1 % (ref 44–72)
NRBC # BLD AUTO: 0 K/UL
NRBC BLD-RTO: 0 /100 WBC (ref 0–0.2)
PLATELET # BLD AUTO: 529 K/UL (ref 164–446)
PMV BLD AUTO: 10 FL (ref 9–12.9)
POTASSIUM SERPL-SCNC: 3.9 MMOL/L (ref 3.6–5.5)
RBC # BLD AUTO: 4.9 M/UL (ref 4.2–5.4)
SODIUM SERPL-SCNC: 140 MMOL/L (ref 135–145)
WBC # BLD AUTO: 9.6 K/UL (ref 4.8–10.8)

## 2024-06-12 PROCEDURE — 80048 BASIC METABOLIC PNL TOTAL CA: CPT

## 2024-06-12 PROCEDURE — 85025 COMPLETE CBC W/AUTO DIFF WBC: CPT

## 2024-06-12 PROCEDURE — 36415 COLL VENOUS BLD VENIPUNCTURE: CPT

## 2024-06-12 PROCEDURE — 70481 CT ORBIT/EAR/FOSSA W/DYE: CPT

## 2024-06-12 PROCEDURE — 700117 HCHG RX CONTRAST REV CODE 255: Performed by: EMERGENCY MEDICINE

## 2024-06-12 PROCEDURE — 99283 EMERGENCY DEPT VISIT LOW MDM: CPT

## 2024-06-12 RX ADMIN — IOHEXOL 80 ML: 350 INJECTION, SOLUTION INTRAVENOUS at 19:35

## 2024-06-12 NOTE — ED PROVIDER NOTES
ED Provider Note    CHIEF COMPLAINT  Chief Complaint   Patient presents with    Eye Pain     L eye pain for 10 days, was seen by PMD and is waiting for ophthalmology appt. States she was told to come to ER for continued pain. States L eye vision is always a little worse but does not to seem to be worsening, just increased pain.       EXTERNAL RECORDS REVIEWED  Patient's last encounter was an ED visit in 2020 she was seen for rectal pain and bleeding diagnosed with hemorrhoids.  Outpatient, occupational clinic visit    In 2014 for dislocation of the left thumb.    HPI/ROS  LIMITATION TO HISTORY   history obtained with help of language line  #[id number].  OUTSIDE HISTORIAN(S):  Friend who also helpd translate per patient request    Felipa Gillis is a 48 y.o. female who presents to the emergency department accompanied by a friend with a chief complaint of 10 days of eye pain.  She was referred to an ophthalmologist but does not yet have an appointment she is waiting for them to call her back.  She was told by her primary care doctor, if she continued to have pain to come to the emergency department.  Pain is intermittent.  She describes it as throbbing.  She also states that it is often a sense of pressure and sometimes when she is having pain she will look in the mirror and note that she has redness on the lateral aspect of her eye.  She denies any changes in her vision.  She did not denies any visual field deficits.  She denies any drainage from her eye.  No matting of the eyelids.  She denies a headache currently.  She denies any trauma to her eye.  She denies ever having gotten something in her eye.  No foreign body sensation.  She does occasionally have a sensation of dry eyes.  She has no other neurologic deficits.  No fever.  Currently, she is having minimal pain.  Yesterday it was quite severe she rates it 8 or 9 out of 10.  It does improve with ibuprofen.  She is never had it before.  " She has no known history of eye disease other than needing glasses and myopia.  She is never been diagnosed with glaucoma.  She denies any history of hypertension or diabetes.    PAST MEDICAL HISTORY   None report    SURGICAL HISTORY   has a past surgical history that includes other abdominal surgery and vaginal hysterectomy total.    FAMILY HISTORY  No family history on file.    SOCIAL HISTORY  Social History     Tobacco Use    Smoking status: Never    Smokeless tobacco: Never   Substance and Sexual Activity    Alcohol use: No    Drug use: No    Sexual activity: Not on file       CURRENT MEDICATIONS  Home Medications    **Home medications have not yet been reviewed for this encounter**         ALLERGIES  No Known Allergies    PHYSICAL EXAM  VITAL SIGNS: BP (!) 156/85   Pulse 85   Temp 36.6 °C (97.9 °F) (Temporal)   Resp 16   Ht 1.575 m (5' 2\")   Wt 110 kg (241 lb 10 oz)   SpO2 96%   BMI 44.19 kg/m²    Vitals reviewed.  Constitutional: Patient is oriented to person, place, and time. Appears well-developed and well-nourished. No distress.    Head: Normocephalic and atraumatic.   Mouth/Throat: Oropharynx is clear   Eyes: Conjunctivae are normal. Pupils are equal, round, and reactive to light. Pressure Left Eye 15mmHg, Right eye 12mmHg on average.   Neck: Normal range of motion. Neck supple.  Cardiovascular: Normal rate, regular rhythm and normal heart sounds.   Pulmonary/Chest: Effort normal and breath sounds normal. No respiratory distress  Musculoskeletal: No edema and no tenderness.   Neurological: No cranial nerve deficits. Normal motor and sensory exam. No focal deficits.   Skin: Skin is warm and dry. No erythema. No pallor.   Psychiatric: Patient has a normal mood and affect.     EKG/LABS  Results for orders placed or performed during the hospital encounter of 06/12/24   CBC WITH DIFFERENTIAL   Result Value Ref Range    WBC 9.6 4.8 - 10.8 K/uL    RBC 4.90 4.20 - 5.40 M/uL    Hemoglobin 14.4 12.0 - 16.0 " g/dL    Hematocrit 44.4 37.0 - 47.0 %    MCV 90.6 81.4 - 97.8 fL    MCH 29.4 27.0 - 33.0 pg    MCHC 32.4 32.2 - 35.5 g/dL    RDW 48.4 35.9 - 50.0 fL    Platelet Count 529 (H) 164 - 446 K/uL    MPV 10.0 9.0 - 12.9 fL    Neutrophils-Polys 64.10 44.00 - 72.00 %    Lymphocytes 27.20 22.00 - 41.00 %    Monocytes 4.90 0.00 - 13.40 %    Eosinophils 2.80 0.00 - 6.90 %    Basophils 0.60 0.00 - 1.80 %    Immature Granulocytes 0.40 0.00 - 0.90 %    Nucleated RBC 0.00 0.00 - 0.20 /100 WBC    Neutrophils (Absolute) 6.15 1.82 - 7.42 K/uL    Lymphs (Absolute) 2.61 1.00 - 4.80 K/uL    Monos (Absolute) 0.47 0.00 - 0.85 K/uL    Eos (Absolute) 0.27 0.00 - 0.51 K/uL    Baso (Absolute) 0.06 0.00 - 0.12 K/uL    Immature Granulocytes (abs) 0.04 0.00 - 0.11 K/uL    NRBC (Absolute) 0.00 K/uL   Basic Metabolic Panel   Result Value Ref Range    Sodium 140 135 - 145 mmol/L    Potassium 3.9 3.6 - 5.5 mmol/L    Chloride 105 96 - 112 mmol/L    Co2 24 20 - 33 mmol/L    Glucose 98 65 - 99 mg/dL    Bun 13 8 - 22 mg/dL    Creatinine 0.53 0.50 - 1.40 mg/dL    Calcium 9.2 8.4 - 10.2 mg/dL    Anion Gap 11.0 7.0 - 16.0   ESTIMATED GFR   Result Value Ref Range    GFR (CKD-EPI) 112 >60 mL/min/1.73 m 2       I have independently interpreted this EKG    RADIOLOGY/PROCEDURES   I have independently interpreted the diagnostic imaging associated with this visit and am waiting the final reading from the radiologist.   My preliminary interpretation is as follows: No mass, or fluid collections    Radiologist interpretation:  [id number]   Final Result         1. The globes are intact. No preseptal or retroorbital stranding or fluid collection.         2. Air-fluid level in the right maxillary sinus could relate to sinusitis.          COURSE & MEDICAL DECISION MAKING    ASSESSMENT, COURSE AND PLAN  Care Narrative:     This is a pleasant 48-year-old female who presents with a week and a half of intermittent, left-sided eye pain.  Some characteristics of her  symptoms are concerning for possible migraine.  She denies any changes in her vision.  Eye pressures are within normal on average, bilaterally.  Inspection of the eye is unremarkable.  She denies any trauma or foreign body sensation.  However, she is been unable to get into see a physician for this and is continuing to have pain and as a result is directed here for further care.  Visual acuity is noted.  Interestingly, patient has 20/200 vision on the right side, 20/200 vision on the left side but together, 20/100.  She wears glasses and does not currently have them.    8:18 PM patient's reevaluated at the bedside.  She is feeling well.  No new symptoms.  Per patient request, her friend at the bedside provides translation.  We discussed CT findings which are overall reassuring.  There is no evidence of mass or fluid collection.  Globes are unremarkable.  Labs are reassuring.  We discussed the possibility that this may be related to migraine.  Additionally, this may simply be eyestrain.  She is encouraged to wear her glasses more regularly.  She is also encouraged to follow-up with ophthalmology.  She is referred to the ophthalmologist on-call here.  Which ever she can get into first, she is overall well-appearing and nontoxic.  She is discharged home in stable condition      ADDITIONAL PROBLEMS MANAGED    DISPOSITION AND DISCUSSIONS  I have discussed management of the patient with the following physicians and ROCAEL's:  None    Discussion of management with other [facility] or appropriate source(s):  None     Escalation of care considered, and ultimately not performed: None    Barriers to care at this time, including but not limited to:  Not established with an ophthalmologist.     Decision tools and prescription drugs considered including, but not limited to: None.    FINAL DIAGNOSIS  1. Acute left eye pain           Electronically signed by: Anat Hernandez D.O., 6/12/2024 2:58 PM

## 2024-06-12 NOTE — ED TRIAGE NOTES
"Chief Complaint   Patient presents with    Eye Pain     L eye pain for 10 days, was seen by PMD and is waiting for ophthalmology appt. States she was told to come to ER for continued pain. States L eye vision is always a little worse but does not to seem to be worsening, just increased pain.     BP (!) 156/85   Pulse 85   Temp 36.6 °C (97.9 °F) (Temporal)   Resp 16   Ht 1.575 m (5' 2\")   Wt 110 kg (241 lb 10 oz)   SpO2 96%   BMI 44.19 kg/m²     "

## 2024-06-13 NOTE — DISCHARGE INSTRUCTIONS
I include information about migraine headaches in your discharge information because I suspect this may represent headache or headache from eyestrain.  Please follow-up with ophthalmology as planned.  You may also call the ophthalmologist on-call for this facility, Dr. Doty.  Information is contained in your discharge papers

## 2024-08-07 ENCOUNTER — HOSPITAL ENCOUNTER (EMERGENCY)
Facility: MEDICAL CENTER | Age: 49
End: 2024-08-07
Attending: EMERGENCY MEDICINE
Payer: COMMERCIAL

## 2024-08-07 VITALS
SYSTOLIC BLOOD PRESSURE: 157 MMHG | OXYGEN SATURATION: 97 % | TEMPERATURE: 97.5 F | DIASTOLIC BLOOD PRESSURE: 82 MMHG | BODY MASS INDEX: 43.43 KG/M2 | HEIGHT: 62 IN | RESPIRATION RATE: 16 BRPM | HEART RATE: 74 BPM | WEIGHT: 236 LBS

## 2024-08-07 DIAGNOSIS — K64.4 EXTERNAL HEMORRHOID: ICD-10-CM

## 2024-08-07 PROCEDURE — 700102 HCHG RX REV CODE 250 W/ 637 OVERRIDE(OP): Performed by: EMERGENCY MEDICINE

## 2024-08-07 PROCEDURE — 99283 EMERGENCY DEPT VISIT LOW MDM: CPT

## 2024-08-07 PROCEDURE — 700111 HCHG RX REV CODE 636 W/ 250 OVERRIDE (IP): Performed by: EMERGENCY MEDICINE

## 2024-08-07 PROCEDURE — A9270 NON-COVERED ITEM OR SERVICE: HCPCS | Performed by: EMERGENCY MEDICINE

## 2024-08-07 RX ORDER — HYDROCODONE BITARTRATE AND ACETAMINOPHEN 5; 325 MG/1; MG/1
1 TABLET ORAL EVERY 6 HOURS PRN
Qty: 12 TABLET | Refills: 0 | Status: SHIPPED | OUTPATIENT
Start: 2024-08-07 | End: 2024-08-11

## 2024-08-07 RX ORDER — HYDROCORTISONE ACETATE 25 MG/1
25 SUPPOSITORY RECTAL EVERY 12 HOURS
Qty: 10 SUPPOSITORY | Refills: 0 | Status: SHIPPED | OUTPATIENT
Start: 2024-08-07

## 2024-08-07 RX ORDER — DOCUSATE SODIUM 100 MG/1
100 CAPSULE, LIQUID FILLED ORAL 2 TIMES DAILY
Qty: 60 CAPSULE | Refills: 0 | Status: SHIPPED | OUTPATIENT
Start: 2024-08-07

## 2024-08-07 RX ORDER — HYDROCODONE BITARTRATE AND ACETAMINOPHEN 5; 325 MG/1; MG/1
2 TABLET ORAL ONCE
Status: COMPLETED | OUTPATIENT
Start: 2024-08-07 | End: 2024-08-07

## 2024-08-07 RX ORDER — ONDANSETRON 4 MG/1
4 TABLET, ORALLY DISINTEGRATING ORAL ONCE
Status: COMPLETED | OUTPATIENT
Start: 2024-08-07 | End: 2024-08-07

## 2024-08-07 RX ADMIN — ONDANSETRON 4 MG: 4 TABLET, ORALLY DISINTEGRATING ORAL at 12:52

## 2024-08-07 RX ADMIN — HYDROCODONE BITARTRATE AND ACETAMINOPHEN 2 TABLET: 5; 325 TABLET ORAL at 12:52

## 2024-08-07 NOTE — ED PROVIDER NOTES
"ED Provider Note    CHIEF COMPLAINT  Chief Complaint   Patient presents with    Rectal Pain     The pt reports pain while pooping for the last 2 days. The pt has been taking meds to relieve pain but with no relief. The pt reports bright red blood this morning. Hx of hemorrhoids for the last couple of months       EXTERNAL RECORDS REVIEWED  Outpatient Notes   imaging, [place]  HPI/ROS  LIMITATION TO HISTORY   Select: : None  OUTSIDE HISTORIAN(S):  None    Felipa Gillis is a 49 y.o. female who presents here for evaluation of hemorrhoid issues.  Patient has history of hemorrhoids, and states that they are \"acting up again.\"  She has no fever chills or vomiting, but does complain of some rectal bleeding.  She states that she has history of external hemorrhoids.  She had 2 bowel movements earlier today both with some bleeding, but she has no lightheadedness dizziness or abdominal pain.  She has no chest pain or shortness of breath.  No blood thinner use    PAST MEDICAL HISTORY   No bleeding disorders    SURGICAL HISTORY   has a past surgical history that includes other abdominal surgery and vaginal hysterectomy total.    FAMILY HISTORY  No family history on file.    SOCIAL HISTORY  Social History     Tobacco Use    Smoking status: Never    Smokeless tobacco: Never   Substance and Sexual Activity    Alcohol use: No    Drug use: No    Sexual activity: Not on file       CURRENT MEDICATIONS  Home Medications       Reviewed by Amos Smith R.N. (Registered Nurse) on 08/07/24 at 1026  Med List Status: Partial     Medication Last Dose Status   hydrocortisone rectal (PERIANAL) 2.5% Cream  Active   ibuprofen (MOTRIN) 200 MG Tab  Active   lidocaine 2 % Gel  Active                    ALLERGIES  No Known Allergies    PHYSICAL EXAM  VITAL SIGNS: BP (!) 157/82   Pulse 74   Temp 36.4 °C (97.5 °F)   Resp 16   Ht 1.575 m (5' 2\")   Wt 107 kg (236 lb)   SpO2 97%   BMI 43.16 kg/m²    Constitutional: Well developed, well " nourished. No acute distress.  HEENT: Normocephalic, atraumatic. Posterior pharynx clear and moist.  Eyes:  EOMI. Normal sclera.  Neck: Supple, Full range of motion, nontender.  Chest/Pulmonary: clear to ausculation. Symmetrical expansion.   Rectal exam; external hemorrhoids x 3 noted, no active bleeding, no thrombosed hemorrhoids.  Musculoskeletal: No deformity, no edema, neurovascular intact.   Neuro: Clear speech, appropriate, cooperative, cranial nerves II-XII grossly intact.  Psych: Normal mood and affect      EKG/LABS  None    RADIOLOGY/PROCEDURES   None      COURSE & MEDICAL DECISION MAKING    ASSESSMENT, COURSE AND PLAN  Care Narrative: This is a 49-year-old female here for evaluations of hemorrhoids.  Patient has external hemorrhoids on exam but no thrombosis.  Patient will be given Anusol, something for pain, and Colace for stool softener.  She will do sitz bath's, and return for any further issues or concerns.        DISPOSITION AND DISCUSSIONS  I have discussed management of the patient with the following physicians and ROCAEL's:  none    Discussion of management with other [facility] or appropriate source(s): None     Escalation of care considered, and ultimately not performed:none    Barriers to care at this time, including but not limited to: Patient does not have established PCP.     Decision tools and prescription drugs considered including, but not limited to: none.    FINAL DIAGNOSIS  1. External hemorrhoid         Electronically signed by: Vinicius Chen D.O., 8/7/2024 4:53 PM

## 2024-08-07 NOTE — ED TRIAGE NOTES
"Chief Complaint   Patient presents with    Rectal Pain     The pt reports pain while pooping for the last 2 days. The pt has been taking meds to relieve pain but with no relief. The pt reports bright red blood this morning. Hx of hemorrhoids for the last couple of months     BP (!) 163/90   Pulse 82   Temp 36.4 °C (97.5 °F) (Temporal)   Resp 16   Ht 1.575 m (5' 2\")   Wt 107 kg (236 lb)   SpO2 98%   BMI 43.16 kg/m²     Pt wheelchair to triage. Pt A&Ox4.  Pt placed back in lobby, educated on triage process, and told to inform staff of any change in condition.     "

## 2024-08-07 NOTE — Clinical Note
Felipa Gillis was seen and treated in our emergency department on 8/7/2024.  She may return to work on 08/10/2024.       If you have any questions or concerns, please don't hesitate to call.      Vinicius Chen D.O.

## 2025-01-23 ENCOUNTER — APPOINTMENT (OUTPATIENT)
Dept: RADIOLOGY | Facility: MEDICAL CENTER | Age: 50
End: 2025-01-23
Attending: EMERGENCY MEDICINE
Payer: COMMERCIAL

## 2025-01-23 ENCOUNTER — HOSPITAL ENCOUNTER (EMERGENCY)
Facility: MEDICAL CENTER | Age: 50
End: 2025-01-23
Attending: EMERGENCY MEDICINE
Payer: COMMERCIAL

## 2025-01-23 VITALS
RESPIRATION RATE: 18 BRPM | TEMPERATURE: 97.5 F | DIASTOLIC BLOOD PRESSURE: 75 MMHG | WEIGHT: 230 LBS | HEIGHT: 62 IN | HEART RATE: 85 BPM | SYSTOLIC BLOOD PRESSURE: 155 MMHG | BODY MASS INDEX: 42.33 KG/M2 | OXYGEN SATURATION: 97 %

## 2025-01-23 DIAGNOSIS — S93.402A SPRAIN OF LEFT ANKLE, UNSPECIFIED LIGAMENT, INITIAL ENCOUNTER: ICD-10-CM

## 2025-01-23 PROCEDURE — A9270 NON-COVERED ITEM OR SERVICE: HCPCS | Performed by: EMERGENCY MEDICINE

## 2025-01-23 PROCEDURE — 73610 X-RAY EXAM OF ANKLE: CPT | Mod: LT

## 2025-01-23 PROCEDURE — 99284 EMERGENCY DEPT VISIT MOD MDM: CPT

## 2025-01-23 PROCEDURE — 700102 HCHG RX REV CODE 250 W/ 637 OVERRIDE(OP): Performed by: EMERGENCY MEDICINE

## 2025-01-23 RX ORDER — NAPROXEN 500 MG/1
500 TABLET ORAL 2 TIMES DAILY WITH MEALS
Qty: 20 TABLET | Refills: 0 | Status: SHIPPED | OUTPATIENT
Start: 2025-01-23 | End: 2025-02-02

## 2025-01-23 RX ORDER — IBUPROFEN 600 MG/1
600 TABLET, FILM COATED ORAL ONCE
Status: COMPLETED | OUTPATIENT
Start: 2025-01-23 | End: 2025-01-23

## 2025-01-23 RX ADMIN — IBUPROFEN 600 MG: 600 TABLET, FILM COATED ORAL at 15:58

## 2025-01-23 ASSESSMENT — PAIN DESCRIPTION - PAIN TYPE: TYPE: ACUTE PAIN

## 2025-01-23 NOTE — ED TRIAGE NOTES
Felipa Gillis  49 y.o. female  Chief Complaint   Patient presents with    T-5000 GLF     Approx 30 minutes ago pt fell while taking the last step of house and rolled her L ankle.   Denies strike to other areas of body.          Pt is alert and oriented, speaking in full sentences, follows commands and responds appropriately to questions. Not in any apparent distress. Respirations are even and unlabored.  Pt placed in lobby. Pt educated on triage process. Pt encouraged to alert staff for any changes.

## 2025-01-24 NOTE — ED PROVIDER NOTES
"ED Provider Note    CHIEF COMPLAINT  Chief Complaint   Patient presents with    T-5000 GLF     Approx 30 minutes ago pt fell while taking the last step of house and rolled her L ankle.   Denies strike to other areas of body.        HPI/ROS  MendySandra Gillis is a 49 y.o. female who presents with left ankle pain.  The patient rolled her ankle while taking a step out of her house prior to arrival.  She presents with localized discomfort to the left ankle.  She does not have any left proximal leg tenderness.  She is unaware of any other injuries.    PAST MEDICAL HISTORY       SURGICAL HISTORY   has a past surgical history that includes other abdominal surgery and vaginal hysterectomy total.    FAMILY HISTORY  No family history on file.    SOCIAL HISTORY  Social History     Tobacco Use    Smoking status: Never    Smokeless tobacco: Never   Substance and Sexual Activity    Alcohol use: No    Drug use: No    Sexual activity: Not on file       CURRENT MEDICATIONS  Home Medications       Reviewed by Rosibel Soler R.N. (Registered Nurse) on 01/23/25 at 1448  Med List Status: Partial     Medication Last Dose Status   docusate sodium (COLACE) 100 MG Cap  Active   hydrocortisone (ANUSOL-HC) 25 MG Suppos  Active   hydrocortisone rectal (PERIANAL) 2.5% Cream  Active   ibuprofen (MOTRIN) 200 MG Tab  Active   lidocaine 2 % Gel  Active                  Audit from Redirected Encounters    **Home medications have not yet been reviewed for this encounter**         ALLERGIES  No Known Allergies    PHYSICAL EXAM  VITAL SIGNS: BP (!) 186/72   Pulse 89   Temp 36.6 °C (97.8 °F) (Temporal)   Resp 20   Ht 1.575 m (5' 2\")   Wt 104 kg (230 lb)   SpO2 97%   BMI 42.07 kg/m²    In general the patient appears uncomfortable    Extremities the patient has soft tissue swelling and tenderness laterally to the left ankle.  There is no obvious deformities.  She has a normal midfoot exam as well as a normal proximal leg exam.    Skin no " erythema nor induration    Neurovascular semination is grossly intact to the left lower extremity      RADIOLOGY/PROCEDURES   I have independently interpreted the diagnostic imaging associated with this visit and am waiting the final reading from the radiologist.   My preliminary interpretation is as follows: X-rays reviewed and there is no acute fracture to the left ankle    Radiologist interpretation:  DX-ANKLE 3+ VIEWS LEFT   Final Result      No radiographic evidence of acute traumatic injury.          COURSE & MEDICAL DECISION MAKING    This a 49-year-old female who presents emergency department with signs and symptoms consistent with a left ankle sprain.  X-rays do not show any evidence of fracture nor dislocation.  The patient be placed in an air splint and should be discharged with crutches.  I will prescribe naproxen for pain control.    The patient is aware her blood pressure is elevated needs to be followed up on an outpatient basis.    FINAL DIAGNOSIS  1.  Left ankle sprain    Disposition  Patient will be discharged in stable condition     Electronically signed by: Aram Wren M.D., 1/23/2025 4:31 PM

## 2025-01-24 NOTE — ED NOTES
Splint applied with crutch instructions. Patient reports readiness for discharge. Discharge instructions and follow-up care reviewed with patient. All questions answered and patient verbalized understanding. Vss. Patient stable and ambulatory upon d/c from ED.

## 2025-06-03 ENCOUNTER — HOSPITAL ENCOUNTER (OUTPATIENT)
Dept: RADIOLOGY | Facility: MEDICAL CENTER | Age: 50
End: 2025-06-03
Payer: COMMERCIAL

## 2025-06-18 ENCOUNTER — HOSPITAL ENCOUNTER (OUTPATIENT)
Facility: MEDICAL CENTER | Age: 50
End: 2025-06-18
Attending: NURSE PRACTITIONER
Payer: COMMERCIAL

## 2025-06-18 DIAGNOSIS — N63.20 LARGE MASS OF LEFT BREAST: ICD-10-CM

## 2025-06-18 PROCEDURE — G0279 TOMOSYNTHESIS, MAMMO: HCPCS

## 2025-06-18 PROCEDURE — 76642 ULTRASOUND BREAST LIMITED: CPT | Mod: RT

## 2025-06-18 NOTE — ADDENDUM NOTE
Encounter addended by: Beatriz Francis on: 6/18/2025 10:43 AM   Actions taken: Imaging Exam ended, Image imported, Clinical Note Signed

## 2025-07-21 ENCOUNTER — HOSPITAL ENCOUNTER (OUTPATIENT)
Dept: HEMATOLOGY ONCOLOGY | Facility: MEDICAL CENTER | Age: 50
End: 2025-07-21
Attending: STUDENT IN AN ORGANIZED HEALTH CARE EDUCATION/TRAINING PROGRAM
Payer: OTHER GOVERNMENT

## 2025-07-21 VITALS
SYSTOLIC BLOOD PRESSURE: 142 MMHG | DIASTOLIC BLOOD PRESSURE: 84 MMHG | BODY MASS INDEX: 42.03 KG/M2 | OXYGEN SATURATION: 98 % | HEART RATE: 91 BPM | HEIGHT: 62 IN | TEMPERATURE: 98.2 F | WEIGHT: 228.4 LBS

## 2025-07-21 DIAGNOSIS — D75.839 THROMBOCYTOSIS: Primary | ICD-10-CM

## 2025-07-21 DIAGNOSIS — M79.2 NERVE PAIN: ICD-10-CM

## 2025-07-21 PROCEDURE — 99212 OFFICE O/P EST SF 10 MIN: CPT | Performed by: STUDENT IN AN ORGANIZED HEALTH CARE EDUCATION/TRAINING PROGRAM

## 2025-07-21 PROCEDURE — 99204 OFFICE O/P NEW MOD 45 MIN: CPT | Performed by: STUDENT IN AN ORGANIZED HEALTH CARE EDUCATION/TRAINING PROGRAM

## 2025-07-21 RX ORDER — GABAPENTIN 100 MG/1
100 CAPSULE ORAL 3 TIMES DAILY
COMMUNITY

## 2025-07-21 RX ORDER — ASPIRIN 81 MG/1
81 TABLET ORAL DAILY
COMMUNITY

## 2025-07-21 ASSESSMENT — ENCOUNTER SYMPTOMS
SPUTUM PRODUCTION: 0
ORTHOPNEA: 0
MEMORY LOSS: 0
FEVER: 0
TREMORS: 0
DIZZINESS: 0
SORE THROAT: 0
PALPITATIONS: 0
SENSORY CHANGE: 0
NAUSEA: 0
DEPRESSION: 0
HEARTBURN: 0
VOMITING: 0
HEADACHES: 0
ABDOMINAL PAIN: 0
NECK PAIN: 0
CHILLS: 0
SHORTNESS OF BREATH: 0
FOCAL WEAKNESS: 0
BLURRED VISION: 0
BRUISES/BLEEDS EASILY: 0
COUGH: 0
WHEEZING: 0
TINGLING: 0
WEIGHT LOSS: 0

## 2025-07-21 NOTE — ADDENDUM NOTE
Encounter addended by: Reed Estrada on: 7/21/2025 11:37 AM   Actions taken: Charge Capture section accepted

## 2025-07-21 NOTE — PROGRESS NOTES
Consult Note: Hematology/Oncology     Primary Care:  BINTA Omalley    Chief Complaint   Patient presents with    New Patient     Thrombocytosis          Subjective:   History of Presenting Illness:  Felipa Gillis is a 50 y.o. female who presents today with thrombocytosis.    She presents to hematology due to an incidental findings of elevated platelet count on yearly CBC. Of note, it was slightly high last year.    She denies any itching, rash or bleeding.      She reports taking ASA and gabapentin    Surgically, she has had 2 c sections and lumpectomy      Past Medical History[1]     Past Surgical History[2]    Social History[3]     No family history on file.    Allergies[4]    Current Medications[5]    Review of Systems   Constitutional:  Negative for chills, fever, malaise/fatigue and weight loss.   HENT:  Negative for congestion, ear pain, nosebleeds and sore throat.    Eyes:  Negative for blurred vision.   Respiratory:  Negative for cough, sputum production, shortness of breath and wheezing.    Cardiovascular:  Negative for chest pain, palpitations, orthopnea and leg swelling.   Gastrointestinal:  Negative for abdominal pain, heartburn, nausea and vomiting.   Genitourinary:  Negative for dysuria, frequency and urgency.   Musculoskeletal:  Negative for neck pain.   Neurological:  Negative for dizziness, tingling, tremors, sensory change, focal weakness and headaches.   Endo/Heme/Allergies:  Does not bruise/bleed easily.   Psychiatric/Behavioral:  Negative for depression, memory loss and suicidal ideas.    All other systems reviewed and are negative.      Problem list, medications, and allergies reviewed by myself today in Epic.     Objective:     Vitals:    07/21/25 1108   BP: (!) 142/84   BP Location: Left arm   Patient Position: Sitting   BP Cuff Size: Large adult   Pulse: 91   Temp: 36.8 °C (98.2 °F)   TempSrc: Temporal   SpO2: 98%   Weight: 104 kg (228 lb 6.3 oz)   Height: 1.575  "m (5' 2.01\")       DESC; KARNOFSKY SCALE WITH ECOG EQUIVALENT: 100, Fully active, able to carry on all pre-disease performed without restriction (ECOG equivalent 0)    DISTRESS LEVEL: no apparent distress    Physical Exam  Constitutional:       General: She is not in acute distress.     Appearance: Normal appearance. She is not ill-appearing.   HENT:      Head: Normocephalic and atraumatic.      Nose: Nose normal.      Mouth/Throat:      Mouth: Mucous membranes are moist.      Pharynx: No oropharyngeal exudate or posterior oropharyngeal erythema.   Eyes:      General: No scleral icterus.     Conjunctiva/sclera: Conjunctivae normal.      Pupils: Pupils are equal, round, and reactive to light.   Cardiovascular:      Rate and Rhythm: Normal rate and regular rhythm.      Pulses: Normal pulses.      Heart sounds: Normal heart sounds. No murmur heard.     No friction rub. No gallop.   Pulmonary:      Effort: Pulmonary effort is normal. No respiratory distress.      Breath sounds: Normal breath sounds. No stridor. No wheezing, rhonchi or rales.   Chest:      Chest wall: No tenderness.   Abdominal:      General: Abdomen is flat. Bowel sounds are normal. There is no distension.      Palpations: Abdomen is soft. There is no mass.      Tenderness: There is no abdominal tenderness. There is no guarding.   Musculoskeletal:         General: No swelling, tenderness or deformity. Normal range of motion.      Cervical back: Normal range of motion and neck supple. No rigidity or tenderness.      Right lower leg: No edema.      Left lower leg: No edema.   Skin:     General: Skin is warm and dry.      Coloration: Skin is not jaundiced or pale.      Findings: No bruising, erythema or rash.   Neurological:      General: No focal deficit present.      Mental Status: She is alert and oriented to person, place, and time. Mental status is at baseline.      Sensory: No sensory deficit.      Motor: No weakness.      Coordination: Coordination " normal.      Gait: Gait normal.   Psychiatric:         Mood and Affect: Mood normal.         Behavior: Behavior normal.         Thought Content: Thought content normal.         Judgment: Judgment normal.         Labs:   Most recent labs reviewed.  Please see the lab tab of chart review    Imaging:   Most recent images below have been independently reviewed by me.  Please see the imaging tab of chart review        Assessment/Plan:     Ms. Sims is a 49 yo F who presents with thrombocytosis    We discussed that this could be primary or secondary.    Primary would be due to MPN, just as ET or PV.    Secondary would be due to trauma, viral illness, medications,anemia or non infectious inflammation.     She does not have any reason secondary thrombocytosis.    Plan  -CBC with diff  -CMP  -JAK2, MPL, CALR    #2. Nerve pain  -continue with gabapentin    #3. Research:    -Discussed [employer] [place] [employer].  Patient interested in a referral.  Referral to [facility] placed.      Patient has 2 chronic medical conditions  I reviewed 3+ labs by outside physician  I interpreted 3+ labs   Reviewed 3+ clinic notes  I ordered 3+ labs    Any questions and concerns raised by the patient were addressed and answered. Patient denies any further questions.  Patient encouraged to call the office with any concerns or issues.     Marleny Monzon M.D.  Hematology/Oncology                    [1] No past medical history on file.  [2]   Past Surgical History:  Procedure Laterality Date    OTHER ABDOMINAL SURGERY      c sections, hysterectomy 1.5 years ago    VAGINAL HYSTERECTOMY TOTAL     [3]   Social History  Tobacco Use    Smoking status: Never    Smokeless tobacco: Never   Substance Use Topics    Alcohol use: No    Drug use: No   [4] No Known Allergies  [5]   Current Outpatient Medications   Medication Sig Dispense Refill    gabapentin (NEURONTIN) 100 MG Cap Take 100 mg by mouth 3 times a day.      aspirin 81 MG EC tablet Take 81 mg by mouth  every day.      lidocaine 2 % Gel Apply 1 cc to anus 3 times a day if needed for pain for 7 days 20 mL 1    ibuprofen (MOTRIN) 200 MG Tab Take 800 mg by mouth every 6 hours as needed for Mild Pain.      hydrocortisone (ANUSOL-HC) 25 MG Suppos Insert 1 Suppository into the rectum every 12 hours. (Patient not taking: Reported on 7/21/2025) 10 Suppository 0    docusate sodium (COLACE) 100 MG Cap Take 1 Capsule by mouth 2 times a day. (Patient not taking: Reported on 7/21/2025) 60 Capsule 0    hydrocortisone rectal (PERIANAL) 2.5% Cream Insert 1 Application in rectum as needed. Indications: Inflamed Hemorrhoids (Patient not taking: Reported on 7/21/2025)       No current facility-administered medications for this encounter.

## 2025-08-14 ENCOUNTER — APPOINTMENT (OUTPATIENT)
Dept: HEMATOLOGY ONCOLOGY | Facility: MEDICAL CENTER | Age: 50
End: 2025-08-14
Attending: STUDENT IN AN ORGANIZED HEALTH CARE EDUCATION/TRAINING PROGRAM

## 2025-08-22 ENCOUNTER — HOSPITAL ENCOUNTER (OUTPATIENT)
Dept: LAB | Facility: MEDICAL CENTER | Age: 50
End: 2025-08-22
Attending: STUDENT IN AN ORGANIZED HEALTH CARE EDUCATION/TRAINING PROGRAM
Payer: COMMERCIAL

## 2025-08-22 DIAGNOSIS — D75.839 THROMBOCYTOSIS: ICD-10-CM

## 2025-08-22 LAB
ALBUMIN SERPL BCP-MCNC: 4.4 G/DL (ref 3.2–4.9)
ALBUMIN/GLOB SERPL: 1.3 G/DL
ALP SERPL-CCNC: 81 U/L (ref 30–99)
ALT SERPL-CCNC: 29 U/L (ref 2–50)
ANION GAP SERPL CALC-SCNC: 11 MMOL/L (ref 7–16)
AST SERPL-CCNC: 33 U/L (ref 12–45)
BASOPHILS # BLD AUTO: 0.9 % (ref 0–1.8)
BASOPHILS # BLD: 0.06 K/UL (ref 0–0.12)
BILIRUB SERPL-MCNC: 0.8 MG/DL (ref 0.1–1.5)
BUN SERPL-MCNC: 10 MG/DL (ref 8–22)
CALCIUM ALBUM COR SERPL-MCNC: 9.7 MG/DL (ref 8.5–10.5)
CALCIUM SERPL-MCNC: 10 MG/DL (ref 8.5–10.5)
CHLORIDE SERPL-SCNC: 104 MMOL/L (ref 96–112)
CO2 SERPL-SCNC: 24 MMOL/L (ref 20–33)
CREAT SERPL-MCNC: 0.61 MG/DL (ref 0.5–1.4)
EOSINOPHIL # BLD AUTO: 0.2 K/UL (ref 0–0.51)
EOSINOPHIL NFR BLD: 2.8 % (ref 0–6.9)
ERYTHROCYTE [DISTWIDTH] IN BLOOD BY AUTOMATED COUNT: 47.1 FL (ref 35.9–50)
GFR SERPLBLD CREATININE-BSD FMLA CKD-EPI: 107 ML/MIN/1.73 M 2
GLOBULIN SER CALC-MCNC: 3.3 G/DL (ref 1.9–3.5)
GLUCOSE SERPL-MCNC: 109 MG/DL (ref 65–99)
HCT VFR BLD AUTO: 44.9 % (ref 37–47)
HGB BLD-MCNC: 14.2 G/DL (ref 12–16)
IMM GRANULOCYTES # BLD AUTO: 0.01 K/UL (ref 0–0.11)
IMM GRANULOCYTES NFR BLD AUTO: 0.1 % (ref 0–0.9)
LYMPHOCYTES # BLD AUTO: 2.02 K/UL (ref 1–4.8)
LYMPHOCYTES NFR BLD: 28.7 % (ref 22–41)
MCH RBC QN AUTO: 28.3 PG (ref 27–33)
MCHC RBC AUTO-ENTMCNC: 31.6 G/DL (ref 32.2–35.5)
MCV RBC AUTO: 89.6 FL (ref 81.4–97.8)
MONOCYTES # BLD AUTO: 0.43 K/UL (ref 0–0.85)
MONOCYTES NFR BLD AUTO: 6.1 % (ref 0–13.4)
NEUTROPHILS # BLD AUTO: 4.32 K/UL (ref 1.82–7.42)
NEUTROPHILS NFR BLD: 61.4 % (ref 44–72)
NRBC # BLD AUTO: 0 K/UL
NRBC BLD-RTO: 0 /100 WBC (ref 0–0.2)
PLATELET # BLD AUTO: 443 K/UL (ref 164–446)
PMV BLD AUTO: 10.3 FL (ref 9–12.9)
POTASSIUM SERPL-SCNC: 3.9 MMOL/L (ref 3.6–5.5)
PROT SERPL-MCNC: 7.7 G/DL (ref 6–8.2)
RBC # BLD AUTO: 5.01 M/UL (ref 4.2–5.4)
SODIUM SERPL-SCNC: 139 MMOL/L (ref 135–145)
WBC # BLD AUTO: 7 K/UL (ref 4.8–10.8)

## 2025-08-22 PROCEDURE — 36415 COLL VENOUS BLD VENIPUNCTURE: CPT

## 2025-08-22 PROCEDURE — 80053 COMPREHEN METABOLIC PANEL: CPT

## 2025-08-22 PROCEDURE — 85025 COMPLETE CBC W/AUTO DIFF WBC: CPT

## 2025-08-22 PROCEDURE — 81270 JAK2 GENE: CPT

## 2025-08-28 ENCOUNTER — HOSPITAL ENCOUNTER (OUTPATIENT)
Dept: HEMATOLOGY ONCOLOGY | Facility: MEDICAL CENTER | Age: 50
End: 2025-08-28
Attending: STUDENT IN AN ORGANIZED HEALTH CARE EDUCATION/TRAINING PROGRAM
Payer: COMMERCIAL

## 2025-08-28 VITALS
DIASTOLIC BLOOD PRESSURE: 80 MMHG | HEART RATE: 95 BPM | OXYGEN SATURATION: 98 % | BODY MASS INDEX: 41.76 KG/M2 | TEMPERATURE: 97.5 F | HEIGHT: 62 IN | SYSTOLIC BLOOD PRESSURE: 150 MMHG

## 2025-08-28 DIAGNOSIS — D75.839 THROMBOCYTOSIS: Primary | ICD-10-CM

## 2025-08-28 DIAGNOSIS — M79.2 NERVE PAIN: ICD-10-CM

## 2025-08-28 PROCEDURE — 99213 OFFICE O/P EST LOW 20 MIN: CPT | Performed by: STUDENT IN AN ORGANIZED HEALTH CARE EDUCATION/TRAINING PROGRAM

## 2025-08-28 ASSESSMENT — ENCOUNTER SYMPTOMS
PALPITATIONS: 0
SHORTNESS OF BREATH: 0
VOMITING: 0
FOCAL WEAKNESS: 0
SENSORY CHANGE: 0
DIZZINESS: 0
ABDOMINAL PAIN: 0
FEVER: 0
HEARTBURN: 0
MEMORY LOSS: 0
CHILLS: 0
COUGH: 0
TREMORS: 0
WHEEZING: 0
SPUTUM PRODUCTION: 0
ORTHOPNEA: 0
BRUISES/BLEEDS EASILY: 0
WEIGHT LOSS: 0
HEADACHES: 0
DEPRESSION: 0
NAUSEA: 0
SORE THROAT: 0
TINGLING: 0
NECK PAIN: 0
BLURRED VISION: 0